# Patient Record
Sex: MALE | Race: ASIAN | NOT HISPANIC OR LATINO | Employment: FULL TIME | URBAN - METROPOLITAN AREA
[De-identification: names, ages, dates, MRNs, and addresses within clinical notes are randomized per-mention and may not be internally consistent; named-entity substitution may affect disease eponyms.]

---

## 2020-05-20 ENCOUNTER — HOSPITAL ENCOUNTER (OUTPATIENT)
Facility: MEDICAL CENTER | Age: 50
End: 2020-05-20
Payer: COMMERCIAL

## 2020-05-21 ENCOUNTER — HOSPITAL ENCOUNTER (OUTPATIENT)
Facility: MEDICAL CENTER | Age: 50
End: 2020-05-21

## 2020-05-23 LAB
SARS-COV-2 RNA SPEC QL NAA+PROBE: NOT DETECTED
SPECIMEN SOURCE: NORMAL

## 2020-05-25 ENCOUNTER — HOSPITAL ENCOUNTER (EMERGENCY)
Dept: HOSPITAL 8 - ED | Age: 50
Discharge: HOME | End: 2020-05-25
Payer: COMMERCIAL

## 2020-05-25 VITALS — WEIGHT: 196.87 LBS | BODY MASS INDEX: 29.84 KG/M2 | HEIGHT: 68 IN

## 2020-05-25 VITALS — DIASTOLIC BLOOD PRESSURE: 109 MMHG | SYSTOLIC BLOOD PRESSURE: 165 MMHG
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
## 2020-05-25 DIAGNOSIS — S01.412A: Primary | ICD-10-CM
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
## 2020-05-25 PROCEDURE — 99281 EMR DPT VST MAYX REQ PHY/QHP: CPT

## 2020-05-25 PROCEDURE — 99282 EMERGENCY DEPT VISIT SF MDM: CPT

## 2020-05-25 PROCEDURE — 12011 RPR F/E/E/N/L/M 2.5 CM/<: CPT

## 2020-05-25 NOTE — NUR
PT PRESENTS TO ED WITH LAC JUST BELOW LEFT EYE S/P ACCIDENTALLY STRIKING WITH 
CORNER OF CAR DOOR AT WORK. WOUND IS NOT BLEEDING, PT DENIES VISUAL CHANGES. PT 
SEEN BY EDMD LAW, PLAN TO IRRIGATE AND CLOSE WITH DERMABOND. PT'S LAST TETANUS 
VACC WAS 3 YEARS AGO. EDT AT BEDSIDE FOR IRRIGATION.

## 2020-05-25 NOTE — NUR
wound irrigated by EDT, closed with dermabond by QUINN Mae. pt a&o, resps 
even and unlabored, bp and spo2 monitors in place. call light in reach. report 
given to JOSE Fleming at bedside.

## 2020-06-15 ENCOUNTER — APPOINTMENT (RX ONLY)
Dept: URBAN - METROPOLITAN AREA CLINIC 4 | Facility: CLINIC | Age: 50
Setting detail: DERMATOLOGY
End: 2020-06-15

## 2020-06-15 DIAGNOSIS — L90.5 SCAR CONDITIONS AND FIBROSIS OF SKIN: ICD-10-CM

## 2020-06-15 DIAGNOSIS — D17 BENIGN LIPOMATOUS NEOPLASM: ICD-10-CM

## 2020-06-15 PROBLEM — D17.22 BENIGN LIPOMATOUS NEOPLASM OF SKIN AND SUBCUTANEOUS TISSUE OF LEFT ARM: Status: ACTIVE | Noted: 2020-06-15

## 2020-06-15 PROCEDURE — 99202 OFFICE O/P NEW SF 15 MIN: CPT

## 2020-06-15 PROCEDURE — ? DIAGNOSIS COMMENT

## 2020-06-15 PROCEDURE — ? DEFER

## 2020-06-15 PROCEDURE — ? ADDITIONAL NOTES

## 2020-06-15 PROCEDURE — ? COUNSELING

## 2020-06-15 PROCEDURE — ? OBSERVATION AND MEASURE

## 2020-06-15 PROCEDURE — ? PRESCRIPTION

## 2020-06-15 RX ORDER — TRETIONIN 0.25 MG/G
CREAM TOPICAL
Qty: 1 | Refills: 0 | Status: ERX | COMMUNITY
Start: 2020-06-15

## 2020-06-15 RX ADMIN — TRETIONIN: 0.25 CREAM TOPICAL at 00:00

## 2020-06-15 ASSESSMENT — LOCATION DETAILED DESCRIPTION DERM
LOCATION DETAILED: LEFT SUPERIOR CENTRAL MALAR CHEEK
LOCATION DETAILED: LEFT DISTAL POSTERIOR UPPER ARM
LOCATION DETAILED: LEFT PROXIMAL DORSAL FOREARM

## 2020-06-15 ASSESSMENT — LOCATION SIMPLE DESCRIPTION DERM
LOCATION SIMPLE: LEFT FOREARM
LOCATION SIMPLE: LEFT UPPER ARM
LOCATION SIMPLE: LEFT CHEEK

## 2020-06-15 ASSESSMENT — LOCATION ZONE DERM
LOCATION ZONE: FACE
LOCATION ZONE: ARM

## 2020-06-15 NOTE — HPI: SKIN LESION (LIPOMA)
How Severe Is Your Lipoma?: mild
Is This A New Presentation, Or A Follow-Up?: Skin Lesions
Additional History: areas are tender to touch

## 2020-06-15 NOTE — HPI: SKIN LESION
Is This A New Presentation, Or A Follow-Up?: Skin Lesion
What Type Of Note Output Would You Prefer (Optional)?: Standard Output
How Severe Is Your Skin Lesion?: mild
Has Your Skin Lesion Been Treated?: not been treated
Additional History: states was hit in the face by a door at work, had a cut - glue was used to repair the area; occurred over memorial day weekend